# Patient Record
Sex: MALE | Race: BLACK OR AFRICAN AMERICAN | NOT HISPANIC OR LATINO
[De-identification: names, ages, dates, MRNs, and addresses within clinical notes are randomized per-mention and may not be internally consistent; named-entity substitution may affect disease eponyms.]

---

## 2020-02-10 ENCOUNTER — APPOINTMENT (OUTPATIENT)
Dept: INTERNAL MEDICINE | Facility: CLINIC | Age: 80
End: 2020-02-10
Payer: MEDICARE

## 2020-02-10 ENCOUNTER — NON-APPOINTMENT (OUTPATIENT)
Age: 80
End: 2020-02-10

## 2020-02-10 VITALS
HEIGHT: 65 IN | BODY MASS INDEX: 27.66 KG/M2 | TEMPERATURE: 98.3 F | WEIGHT: 166 LBS | HEART RATE: 54 BPM | OXYGEN SATURATION: 96 %

## 2020-02-10 VITALS — SYSTOLIC BLOOD PRESSURE: 130 MMHG | DIASTOLIC BLOOD PRESSURE: 70 MMHG

## 2020-02-10 DIAGNOSIS — Z87.891 PERSONAL HISTORY OF NICOTINE DEPENDENCE: ICD-10-CM

## 2020-02-10 DIAGNOSIS — Z00.00 ENCOUNTER FOR GENERAL ADULT MEDICAL EXAMINATION W/OUT ABNORMAL FINDINGS: ICD-10-CM

## 2020-02-10 DIAGNOSIS — N40.0 BENIGN PROSTATIC HYPERPLASIA WITHOUT LOWER URINARY TRACT SYMPMS: ICD-10-CM

## 2020-02-10 DIAGNOSIS — E11.9 TYPE 2 DIABETES MELLITUS W/OUT COMPLICATIONS: ICD-10-CM

## 2020-02-10 DIAGNOSIS — I10 ESSENTIAL (PRIMARY) HYPERTENSION: ICD-10-CM

## 2020-02-10 DIAGNOSIS — G47.00 INSOMNIA, UNSPECIFIED: ICD-10-CM

## 2020-02-10 DIAGNOSIS — Z80.9 FAMILY HISTORY OF MALIGNANT NEOPLASM, UNSPECIFIED: ICD-10-CM

## 2020-02-10 PROCEDURE — 36415 COLL VENOUS BLD VENIPUNCTURE: CPT

## 2020-02-10 PROCEDURE — 93000 ELECTROCARDIOGRAM COMPLETE: CPT

## 2020-02-10 PROCEDURE — 99205 OFFICE O/P NEW HI 60 MIN: CPT | Mod: 25

## 2020-02-10 RX ORDER — RANITIDINE HYDROCHLORIDE 150 MG/1
150 CAPSULE ORAL
Refills: 0 | Status: ACTIVE | COMMUNITY

## 2020-02-10 RX ORDER — METOPROLOL TARTRATE 50 MG/1
50 TABLET, FILM COATED ORAL DAILY
Qty: 90 | Refills: 1 | Status: ACTIVE | COMMUNITY

## 2020-02-10 RX ORDER — ZOLPIDEM TARTRATE 10 MG/1
10 TABLET, FILM COATED ORAL
Refills: 0 | Status: ACTIVE | COMMUNITY

## 2020-02-10 RX ORDER — CETIRIZINE HCL 10 MG
10 TABLET ORAL DAILY
Refills: 0 | Status: ACTIVE | COMMUNITY

## 2020-02-10 RX ORDER — TAMSULOSIN HYDROCHLORIDE 0.4 MG/1
0.4 CAPSULE ORAL
Qty: 30 | Refills: 2 | Status: ACTIVE | COMMUNITY

## 2020-02-10 RX ORDER — GLIMEPIRIDE 2 MG/1
2 TABLET ORAL
Refills: 0 | Status: ACTIVE | COMMUNITY

## 2020-02-10 RX ORDER — NAPROXEN 500 MG/1
500 TABLET ORAL
Qty: 14 | Refills: 0 | Status: ACTIVE | COMMUNITY

## 2020-02-10 RX ORDER — LISINOPRIL AND HYDROCHLOROTHIAZIDE TABLETS 20; 12.5 MG/1; MG/1
20-12.5 TABLET ORAL DAILY
Refills: 0 | Status: ACTIVE | COMMUNITY

## 2020-02-10 RX ORDER — AMLODIPINE BESYLATE 10 MG/1
10 TABLET ORAL DAILY
Refills: 0 | Status: ACTIVE | COMMUNITY

## 2020-02-10 RX ORDER — ZOLPIDEM TARTRATE 10 MG/1
10 TABLET ORAL
Qty: 30 | Refills: 0 | Status: ACTIVE | COMMUNITY
Start: 2020-02-10 | End: 1900-01-01

## 2020-02-10 RX ORDER — ATORVASTATIN CALCIUM 40 MG/1
40 TABLET, FILM COATED ORAL
Qty: 90 | Refills: 1 | Status: ACTIVE | COMMUNITY

## 2020-02-11 LAB
ALBUMIN SERPL ELPH-MCNC: 4.5 G/DL
ALP BLD-CCNC: 63 U/L
ALT SERPL-CCNC: 16 U/L
ANION GAP SERPL CALC-SCNC: 14 MMOL/L
APPEARANCE: CLEAR
AST SERPL-CCNC: 24 U/L
BACTERIA: NEGATIVE
BASOPHILS # BLD AUTO: 0.06 K/UL
BASOPHILS NFR BLD AUTO: 1 %
BILIRUB SERPL-MCNC: 0.3 MG/DL
BILIRUBIN URINE: NEGATIVE
BLOOD URINE: NEGATIVE
BUN SERPL-MCNC: 23 MG/DL
CALCIUM SERPL-MCNC: 10.3 MG/DL
CHLORIDE SERPL-SCNC: 107 MMOL/L
CHOLEST SERPL-MCNC: 156 MG/DL
CHOLEST/HDLC SERPL: 2.9 RATIO
CO2 SERPL-SCNC: 28 MMOL/L
COLOR: NORMAL
CREAT SERPL-MCNC: 2.15 MG/DL
EOSINOPHIL # BLD AUTO: 0.35 K/UL
EOSINOPHIL NFR BLD AUTO: 6 %
ESTIMATED AVERAGE GLUCOSE: 131 MG/DL
GLUCOSE QUALITATIVE U: NEGATIVE
GLUCOSE SERPL-MCNC: 64 MG/DL
HBA1C MFR BLD HPLC: 6.2 %
HCT VFR BLD CALC: 36.6 %
HDLC SERPL-MCNC: 53 MG/DL
HGB BLD-MCNC: 11.5 G/DL
HYALINE CASTS: 0 /LPF
IMM GRANULOCYTES NFR BLD AUTO: 0.2 %
KETONES URINE: NEGATIVE
LDLC SERPL CALC-MCNC: 80 MG/DL
LEUKOCYTE ESTERASE URINE: NEGATIVE
LYMPHOCYTES # BLD AUTO: 2.33 K/UL
LYMPHOCYTES NFR BLD AUTO: 39.6 %
MAN DIFF?: NORMAL
MCHC RBC-ENTMCNC: 29.9 PG
MCHC RBC-ENTMCNC: 31.4 GM/DL
MCV RBC AUTO: 95.3 FL
MICROSCOPIC-UA: NORMAL
MONOCYTES # BLD AUTO: 0.51 K/UL
MONOCYTES NFR BLD AUTO: 8.7 %
NEUTROPHILS # BLD AUTO: 2.62 K/UL
NEUTROPHILS NFR BLD AUTO: 44.5 %
NITRITE URINE: NEGATIVE
PH URINE: 5.5
PLATELET # BLD AUTO: 259 K/UL
POTASSIUM SERPL-SCNC: 4.1 MMOL/L
PROT SERPL-MCNC: 7.1 G/DL
PROTEIN URINE: ABNORMAL
RBC # BLD: 3.84 M/UL
RBC # FLD: 14.6 %
RED BLOOD CELLS URINE: 0 /HPF
SODIUM SERPL-SCNC: 148 MMOL/L
SPECIFIC GRAVITY URINE: 1.01
SQUAMOUS EPITHELIAL CELLS: 0 /HPF
T4 FREE SERPL-MCNC: 1.1 NG/DL
TRIGL SERPL-MCNC: 116 MG/DL
TSH SERPL-ACNC: 2.49 UIU/ML
UROBILINOGEN URINE: NORMAL
WBC # FLD AUTO: 5.88 K/UL
WHITE BLOOD CELLS URINE: 0 /HPF

## 2020-03-09 ENCOUNTER — APPOINTMENT (OUTPATIENT)
Dept: INTERNAL MEDICINE | Facility: CLINIC | Age: 80
End: 2020-03-09
Payer: MEDICARE

## 2020-03-09 VITALS
HEART RATE: 53 BPM | HEIGHT: 65 IN | OXYGEN SATURATION: 97 % | BODY MASS INDEX: 25.66 KG/M2 | DIASTOLIC BLOOD PRESSURE: 70 MMHG | SYSTOLIC BLOOD PRESSURE: 110 MMHG | TEMPERATURE: 98.1 F | WEIGHT: 154 LBS

## 2020-03-09 PROCEDURE — 36415 COLL VENOUS BLD VENIPUNCTURE: CPT

## 2020-03-09 PROCEDURE — 99214 OFFICE O/P EST MOD 30 MIN: CPT | Mod: 25

## 2020-03-09 NOTE — REVIEW OF SYSTEMS
[Fever] : no fever [Chest Pain] : no chest pain [Shortness Of Breath] : no shortness of breath [Abdominal Pain] : no abdominal pain [Joint Pain] : joint pain [Back Pain] : no back pain [Memory Loss] : memory loss [Anxiety] : no anxiety [FreeTextEntry9] : see HPI

## 2020-03-09 NOTE — HEALTH RISK ASSESSMENT
[Never (0 pts)] : Never (0 points) [No] : In the past 12 months have you used drugs other than those required for medical reasons? No [No falls in past year] : Patient reported no falls in the past year [0] : 2) Feeling down, depressed, or hopeless: Not at all (0) [] : No [de-identified] : no [de-identified] : no [Audit-CScore] : 0 [de-identified] : Walking  [de-identified] : normal  [FTA6Mpbxy] : 0

## 2020-03-09 NOTE — HISTORY OF PRESENT ILLNESS
[FreeTextEntry1] : follow-up to check labs and blood pressure [de-identified] : 80 yo male with hx diabetes and pain with decreased ROM R shoulder\par no hx trauma\par decreased glimiperide to once daily

## 2020-03-09 NOTE — PLAN
[FreeTextEntry1] : Will repeat labs to assess creatinine and glucose\par need for MRI discussed\par continue present management otherwise

## 2020-04-04 LAB
ALBUMIN SERPL ELPH-MCNC: 4.3 G/DL
ALP BLD-CCNC: 60 U/L
ALT SERPL-CCNC: 14 U/L
ANION GAP SERPL CALC-SCNC: 11 MMOL/L
AST SERPL-CCNC: 24 U/L
BASOPHILS # BLD AUTO: 0.05 K/UL
BASOPHILS NFR BLD AUTO: 0.6 %
BILIRUB SERPL-MCNC: 0.3 MG/DL
BUN SERPL-MCNC: 29 MG/DL
CALCIUM SERPL-MCNC: 9.7 MG/DL
CHLORIDE SERPL-SCNC: 109 MMOL/L
CHOLEST SERPL-MCNC: 148 MG/DL
CHOLEST/HDLC SERPL: 2.6 RATIO
CO2 SERPL-SCNC: 26 MMOL/L
CREAT SERPL-MCNC: 2.21 MG/DL
CREAT SPEC-SCNC: 118 MG/DL
EOSINOPHIL # BLD AUTO: 0.34 K/UL
EOSINOPHIL NFR BLD AUTO: 4.3 %
ESTIMATED AVERAGE GLUCOSE: 120 MG/DL
GLUCOSE SERPL-MCNC: 68 MG/DL
HBA1C MFR BLD HPLC: 5.8 %
HCT VFR BLD CALC: 34.9 %
HDLC SERPL-MCNC: 56 MG/DL
HGB BLD-MCNC: 10.7 G/DL
IMM GRANULOCYTES NFR BLD AUTO: 0.4 %
LDLC SERPL CALC-MCNC: 73 MG/DL
LYMPHOCYTES # BLD AUTO: 1.48 K/UL
LYMPHOCYTES NFR BLD AUTO: 18.9 %
MAN DIFF?: NORMAL
MCHC RBC-ENTMCNC: 30 PG
MCHC RBC-ENTMCNC: 30.7 GM/DL
MCV RBC AUTO: 97.8 FL
MICROALBUMIN 24H UR DL<=1MG/L-MCNC: 8.4 MG/DL
MICROALBUMIN/CREAT 24H UR-RTO: 71 MG/G
MONOCYTES # BLD AUTO: 0.56 K/UL
MONOCYTES NFR BLD AUTO: 7.2 %
NEUTROPHILS # BLD AUTO: 5.36 K/UL
NEUTROPHILS NFR BLD AUTO: 68.6 %
PLATELET # BLD AUTO: 196 K/UL
POTASSIUM SERPL-SCNC: 4.5 MMOL/L
PROT SERPL-MCNC: 6.5 G/DL
RBC # BLD: 3.57 M/UL
RBC # FLD: 14.8 %
SODIUM SERPL-SCNC: 145 MMOL/L
TRIGL SERPL-MCNC: 93 MG/DL
TSH SERPL-ACNC: 2.1 UIU/ML
WBC # FLD AUTO: 7.82 K/UL

## 2020-07-22 ENCOUNTER — APPOINTMENT (OUTPATIENT)
Dept: INTERNAL MEDICINE | Facility: CLINIC | Age: 80
End: 2020-07-22

## 2020-08-19 ENCOUNTER — APPOINTMENT (OUTPATIENT)
Dept: INTERNAL MEDICINE | Facility: CLINIC | Age: 80
End: 2020-08-19

## 2020-11-19 NOTE — HEALTH RISK ASSESSMENT
[Good] : ~his/her~  mood as  good [No] : In the past 12 months have you used drugs other than those required for medical reasons? No [0] : 1) Little interest or pleasure doing things: Not at all (0) [No falls in past year] : Patient reported no falls in the past year [1] : 2) Feeling down, depressed, or hopeless for several days (1) [HIV test declined] : HIV test declined [Hepatitis C test declined] : Hepatitis C test declined [With Family] : lives with family [Retired] : retired [] :  [Feels Safe at Home] : Feels safe at home [Reports changes in hearing] : Reports changes in hearing [Reports changes in vision] : Reports changes in vision [Smoke Detector] : smoke detector [Carbon Monoxide Detector] : carbon monoxide detector [Sunscreen] : uses sunscreen [] : No [de-identified] : [facility]TH[facility]IC [Audit-CScore] : 0 [de-identified] : [employer] 3-4 DAYS PER WEEK. [de-identified] : Normal [TEH3Wzkae] : 1 [Reports changes in dental health] : Reports no changes in dental health [ColonoscopyDate] : 01/18

## 2020-11-19 NOTE — PLAN
[FreeTextEntry1] : Blood drawn for routine labs\par Need to decrease use of Ambien discussed\par Will check MRI of right shoulder\par Continue current medications, other than metoprolol, pending results of labs\par Metoprolol discontinued because of significant bradycardia. \par ecg sinus rhythm rate 50.\par

## 2020-11-19 NOTE — REVIEW OF SYSTEMS
[Fever] : no fever [Chest Pain] : no chest pain [Shortness Of Breath] : no shortness of breath [Abdominal Pain] : no abdominal pain [Itching] : no itching [Dizziness] : no dizziness [Fainting] : no fainting

## 2020-11-19 NOTE — PHYSICAL EXAM
[No Acute Distress] : no acute distress [Normal Oropharynx] : the oropharynx was normal [Normal Sclera/Conjunctiva] : normal sclera/conjunctiva [No Lymphadenopathy] : no lymphadenopathy [Soft] : abdomen soft [Regular Rhythm] : with a regular rhythm [Normal Supraclavicular Nodes] : no supraclavicular lymphadenopathy [Non Tender] : non-tender [Normal Posterior Cervical Nodes] : no posterior cervical lymphadenopathy [No Rash] : no rash [No CVA Tenderness] : no CVA  tenderness [Alert and Oriented x3] : oriented to person, place, and time [Normal Affect] : the affect was normal [de-identified] : rate approx 50 [de-identified] : mild swelling R shoulder

## 2020-11-19 NOTE — HISTORY OF PRESENT ILLNESS
[FreeTextEntry1] : new patient CPE [de-identified] : Mr Scott is a 78yo male with a past medical history of HTN and diabetes taking multiple medications as noted. \vadim Comes to the office for CPE.\par He had previously been followed by a physician in [place] but wishes to transfer care here. \par At the time of the exam his major complaint involved long standing discomfort in the right shoulder with pain radiating down the right arm. This had been evaluated by Dr. Cruz without a clear diagnosis.

## 2020-11-19 NOTE — ASSESSMENT
[FreeTextEntry1] : Presents with pain in the R shoulder radiating down the right arm. \par Previously evaluated by ortho however no clear diagnosis established\par Unclear with extent of control with diabetes but has been taking all medications as listed\par

## 2021-10-06 PROBLEM — I10 ESSENTIAL HYPERTENSION: Status: ACTIVE | Noted: 2020-02-10

## 2022-10-07 ENCOUNTER — TRANSCRIPTION ENCOUNTER (OUTPATIENT)
Age: 82
End: 2022-10-07

## 2022-10-07 ENCOUNTER — OUTPATIENT (OUTPATIENT)
Dept: OUTPATIENT SERVICES | Facility: HOSPITAL | Age: 82
LOS: 1 days | End: 2022-10-07
Payer: MEDICARE

## 2022-10-07 VITALS
SYSTOLIC BLOOD PRESSURE: 158 MMHG | DIASTOLIC BLOOD PRESSURE: 68 MMHG | OXYGEN SATURATION: 99 % | RESPIRATION RATE: 18 BRPM | HEART RATE: 54 BPM

## 2022-10-07 VITALS
SYSTOLIC BLOOD PRESSURE: 189 MMHG | HEART RATE: 54 BPM | DIASTOLIC BLOOD PRESSURE: 77 MMHG | WEIGHT: 145.06 LBS | OXYGEN SATURATION: 99 % | TEMPERATURE: 98 F | HEIGHT: 64 IN | RESPIRATION RATE: 18 BRPM

## 2022-10-07 DIAGNOSIS — R06.00 DYSPNEA, UNSPECIFIED: ICD-10-CM

## 2022-10-07 DIAGNOSIS — R06.01 ORTHOPNEA: ICD-10-CM

## 2022-10-07 LAB
ANION GAP SERPL CALC-SCNC: 12 MMOL/L — SIGNIFICANT CHANGE UP (ref 5–17)
BUN SERPL-MCNC: 43 MG/DL — HIGH (ref 7–23)
CALCIUM SERPL-MCNC: 9.7 MG/DL — SIGNIFICANT CHANGE UP (ref 8.4–10.5)
CHLORIDE SERPL-SCNC: 105 MMOL/L — SIGNIFICANT CHANGE UP (ref 96–108)
CO2 SERPL-SCNC: 27 MMOL/L — SIGNIFICANT CHANGE UP (ref 22–31)
CREAT SERPL-MCNC: 2.27 MG/DL — HIGH (ref 0.5–1.3)
EGFR: 28 ML/MIN/1.73M2 — LOW
GLUCOSE BLDC GLUCOMTR-MCNC: 118 MG/DL — HIGH (ref 70–99)
GLUCOSE BLDC GLUCOMTR-MCNC: 91 MG/DL — SIGNIFICANT CHANGE UP (ref 70–99)
GLUCOSE SERPL-MCNC: 106 MG/DL — HIGH (ref 70–99)
HCT VFR BLD CALC: 37.5 % — LOW (ref 39–50)
HGB BLD-MCNC: 11.9 G/DL — LOW (ref 13–17)
MCHC RBC-ENTMCNC: 28.9 PG — SIGNIFICANT CHANGE UP (ref 27–34)
MCHC RBC-ENTMCNC: 31.7 GM/DL — LOW (ref 32–36)
MCV RBC AUTO: 91 FL — SIGNIFICANT CHANGE UP (ref 80–100)
NRBC # BLD: 0 /100 WBCS — SIGNIFICANT CHANGE UP (ref 0–0)
PLATELET # BLD AUTO: 145 K/UL — LOW (ref 150–400)
POTASSIUM SERPL-MCNC: 4.4 MMOL/L — SIGNIFICANT CHANGE UP (ref 3.5–5.3)
POTASSIUM SERPL-SCNC: 4.4 MMOL/L — SIGNIFICANT CHANGE UP (ref 3.5–5.3)
RBC # BLD: 4.12 M/UL — LOW (ref 4.2–5.8)
RBC # FLD: 14.6 % — HIGH (ref 10.3–14.5)
SODIUM SERPL-SCNC: 144 MMOL/L — SIGNIFICANT CHANGE UP (ref 135–145)
WBC # BLD: 7.88 K/UL — SIGNIFICANT CHANGE UP (ref 3.8–10.5)
WBC # FLD AUTO: 7.88 K/UL — SIGNIFICANT CHANGE UP (ref 3.8–10.5)

## 2022-10-07 PROCEDURE — 93010 ELECTROCARDIOGRAM REPORT: CPT

## 2022-10-07 PROCEDURE — C1769: CPT

## 2022-10-07 PROCEDURE — 80048 BASIC METABOLIC PNL TOTAL CA: CPT

## 2022-10-07 PROCEDURE — 99152 MOD SED SAME PHYS/QHP 5/>YRS: CPT

## 2022-10-07 PROCEDURE — 93005 ELECTROCARDIOGRAM TRACING: CPT

## 2022-10-07 PROCEDURE — 82962 GLUCOSE BLOOD TEST: CPT

## 2022-10-07 PROCEDURE — 85027 COMPLETE CBC AUTOMATED: CPT

## 2022-10-07 PROCEDURE — C1887: CPT

## 2022-10-07 PROCEDURE — 93454 CORONARY ARTERY ANGIO S&I: CPT | Mod: 26

## 2022-10-07 PROCEDURE — C1894: CPT

## 2022-10-07 PROCEDURE — 93454 CORONARY ARTERY ANGIO S&I: CPT

## 2022-10-07 RX ORDER — LINAGLIPTIN 5 MG/1
1 TABLET, FILM COATED ORAL
Qty: 0 | Refills: 0 | DISCHARGE

## 2022-10-07 RX ORDER — FLUTICASONE FUROATE AND VILANTEROL TRIFENATATE 100; 25 UG/1; UG/1
1 POWDER RESPIRATORY (INHALATION)
Qty: 0 | Refills: 0 | DISCHARGE

## 2022-10-07 RX ORDER — ASPIRIN/CALCIUM CARB/MAGNESIUM 324 MG
1 TABLET ORAL
Qty: 0 | Refills: 0 | DISCHARGE

## 2022-10-07 RX ORDER — METOPROLOL TARTRATE 50 MG
1 TABLET ORAL
Qty: 0 | Refills: 0 | DISCHARGE

## 2022-10-07 RX ORDER — FERROUS SULFATE 325(65) MG
1 TABLET ORAL
Qty: 0 | Refills: 0 | DISCHARGE

## 2022-10-07 RX ORDER — ALFUZOSIN HYDROCHLORIDE 10 MG/1
1 TABLET, EXTENDED RELEASE ORAL
Qty: 0 | Refills: 0 | DISCHARGE

## 2022-10-07 RX ORDER — LISINOPRIL 2.5 MG/1
1 TABLET ORAL
Qty: 0 | Refills: 0 | DISCHARGE

## 2022-10-07 RX ORDER — BUMETANIDE 0.25 MG/ML
1 INJECTION INTRAMUSCULAR; INTRAVENOUS
Qty: 0 | Refills: 0 | DISCHARGE

## 2022-10-07 RX ORDER — ATORVASTATIN CALCIUM 80 MG/1
1 TABLET, FILM COATED ORAL
Qty: 0 | Refills: 0 | DISCHARGE

## 2022-10-07 RX ORDER — HYDRALAZINE HCL 50 MG
1 TABLET ORAL
Qty: 0 | Refills: 0 | DISCHARGE

## 2022-10-07 NOTE — H&P CARDIOLOGY - HISTORY OF PRESENT ILLNESS
81 year old male with no implantable cardiac devices and PMH HTN, DMT2, HLD, ex-smoker, TIA, arthritis, CKD (stage 3, baseline creat 1.5). Pt p/w worsening H[facility]OD and pedal edema to Dr Nadia Sparks, referred for C after abnormal stress test revealed small, mild, fixed defect in mid inferior and basal inferior segments c/w RCA infarct. EF on stress test normal. Denies CP, SOB, palpitations, N/V, fever/chills, abd pain, numbness/tingling/weakness, other c/o.

## 2022-10-07 NOTE — ASU DISCHARGE PLAN (ADULT/PEDIATRIC) - CARE PROVIDER_API CALL
Jimmy Sparks  CARDIOLOGY  [address], [address]  [place] [address]  Phone: [phone]  Fax: [phone]  Follow Up Time: 2 weeks

## 2022-10-07 NOTE — ASU DISCHARGE PLAN (ADULT/PEDIATRIC) - NS MD DC FALL RISK RISK
For information on Fall & Injury Prevention, visit: [email or web address] OR  [email or web address] OR  https://www.cdc.gov/steadi/patient.html

## 2022-10-07 NOTE — H&P CARDIOLOGY - NSICDXPASTMEDICALHX_GEN_ALL_CORE_FT
PAST MEDICAL HISTORY:  Arthritis     Former tobacco use     HLD (hyperlipidemia)     HTN (hypertension)     Stage 3 chronic kidney disease     Transient ischemic attack (TIA)     Type II diabetes mellitus      PAST MEDICAL HISTORY:  Arthritis     Asthma     Former tobacco use     HLD (hyperlipidemia)     HTN (hypertension)     Stage 3 chronic kidney disease     Transient ischemic attack (TIA)     Type II diabetes mellitus

## 2022-10-07 NOTE — ASU DISCHARGE PLAN (ADULT/PEDIATRIC) - ASU DC SPECIAL INSTRUCTIONSFT
Wound Care:   the day AFTER your procedure...     Remove the bandage from the site and gently clean with soap and water then pat dry; leave open to air.     You may take a brief shower     Do NOT apply lotions, creams, powders, ointments, or perfumes to your incision site unless prescribed by your physician     Do NOT soak your procedure site for 1 week (no baths, no pools, no tubs, etc...)     Check  your groin and /or wrist daily. A small amount of bruising, and soreness are normal    ACTIVITY: for 24 hours      - DO NOT DRIVE     - DO NOT make any important decisions or sign legal documents      - DO NOT operate heavy machinaries      - you may resume sexual activity in 48 hours, unless otherwise instructed by your cardiologist          If your procedure was done through the WRIST: for the NEXT 3 DAYS:          - avoid pushing, pulling, with that affected wrist           - avoid repeated movement of that hand and wrist ( eg: typing, hammering)          - DO NOT LIFT anything more than 5 pounds         If your procedure was done through the GROIN: for the NEXT 5 DAYS          - Limit climbing stairs, DO NOT soak in bathtub or pool          - no strenous activities, pushing, pulling, straining          - Do not lift anything more than 10 pounds     MEDICATION:      take your medications as explained (see discharge paperwork)          Take as prescribed.          DO NOT STOP taking them without consulting with your cardiologist first.     Follow heart healthy diet recommended by your doctor.    If you smoke: STOP SMOKING (you may call the [facility] at [phone] if you need assistance)    CALL your cardiologist/primary care doctor to make appointment in 2 WEEKS     **CALL YOUR DOCTOR if you experience     fever, chills, body aches, or severe pain, swelling, redness, heat or yellow discharge at incision site     bleeding or excruciating pain at the procedural site, swelling (golf ball size) at your procedural site     CHEST PAIN     numbness, tingling, temperature change (of your procedural site)     If you are unable to reach your doctor, you may contact:      -[facility] Cardiology Office at [phone]       -Cardiac Short Stay Unit at [phone]     -Cardiology Recovery Suite at [phone]    **Call 911 immediately if:     - your hand or leg becomes blue, feels cold to touch, or if you have numbness or tingling     - bleeding or swelling from your wrist or groin site cannot be controlled or if area becomes very red or hot to touch     - you have pain, pressure, tightness in your chest, arms, jaw or stomach; shortness of breath; nausea or excessive sweating;             lightheadedness; dizziness or a fainting spell; or if you have sudden back or stomach pain     -you have rapid heartbeat or palpitations     - you have bright red blood in large amounts of significant swelling at the puncture site